# Patient Record
Sex: FEMALE | URBAN - METROPOLITAN AREA
[De-identification: names, ages, dates, MRNs, and addresses within clinical notes are randomized per-mention and may not be internally consistent; named-entity substitution may affect disease eponyms.]

---

## 2020-12-03 ENCOUNTER — NEW PATIENT (OUTPATIENT)
Dept: URBAN - METROPOLITAN AREA CLINIC 43 | Facility: CLINIC | Age: 65
End: 2020-12-03
Payer: COMMERCIAL

## 2020-12-03 DIAGNOSIS — H11.013 AMYLOID PTERYGIUM OF EYE, BILATERAL: ICD-10-CM

## 2020-12-03 DIAGNOSIS — H25.813 COMBINED FORMS OF AGE-RELATED CATARACT, BILATERAL: Primary | ICD-10-CM

## 2020-12-03 DIAGNOSIS — H43.813 VITREOUS DEGENERATION, BILATERAL: ICD-10-CM

## 2020-12-03 DIAGNOSIS — R73.03 OTHER ABNORMAL GLUCOSE: ICD-10-CM

## 2020-12-03 DIAGNOSIS — H52.4 PRESBYOPIA: ICD-10-CM

## 2020-12-03 PROCEDURE — 92004 COMPRE OPH EXAM NEW PT 1/>: CPT | Performed by: OPTOMETRIST

## 2020-12-03 ASSESSMENT — VISUAL ACUITY
OS: 20/25
OD: 20/30

## 2020-12-03 ASSESSMENT — KERATOMETRY
OS: 44.00
OD: 45.13

## 2022-11-10 ENCOUNTER — OFFICE VISIT (OUTPATIENT)
Dept: URBAN - METROPOLITAN AREA CLINIC 43 | Facility: CLINIC | Age: 67
End: 2022-11-10
Payer: COMMERCIAL

## 2022-11-10 DIAGNOSIS — H43.813 VITREOUS DEGENERATION, BILATERAL: ICD-10-CM

## 2022-11-10 DIAGNOSIS — R73.03 OTHER ABNORMAL GLUCOSE: Primary | ICD-10-CM

## 2022-11-10 DIAGNOSIS — H53.022 REFRACTIVE AMBLYOPIA, LEFT EYE: ICD-10-CM

## 2022-11-10 DIAGNOSIS — Z79.899 OTHER LONG TERM DRUG THERAPY: ICD-10-CM

## 2022-11-10 DIAGNOSIS — H11.013 AMYLOID PTERYGIUM OF EYE, BILATERAL: ICD-10-CM

## 2022-11-10 DIAGNOSIS — H25.813 COMBINED FORMS OF AGE-RELATED CATARACT, BILATERAL: ICD-10-CM

## 2022-11-10 PROCEDURE — 99214 OFFICE O/P EST MOD 30 MIN: CPT | Performed by: OPTOMETRIST

## 2022-11-10 PROCEDURE — 92134 CPTRZ OPH DX IMG PST SGM RTA: CPT | Performed by: OPTOMETRIST

## 2022-11-10 ASSESSMENT — KERATOMETRY
OD: 45.00
OS: 44.13

## 2022-11-10 ASSESSMENT — INTRAOCULAR PRESSURE
OS: 14
OD: 15

## 2022-11-10 ASSESSMENT — VISUAL ACUITY
OD: 20/25
OS: 20/40

## 2022-11-10 NOTE — IMPRESSION/PLAN
Impression: Amyloid pterygium of eye, bilateral Plan: s/p excision OS, recommend UV and wind protection, ATs, monitor

## 2022-11-10 NOTE — IMPRESSION/PLAN
Impression: Combined forms of age-related cataract, bilateral: H25.813. Plan:  Discussed cataracts with patient. Discussed treatment options. Surgical treatment is recommended. Surgical risks and benefits discussed. Patient elects surgical treatment. Recommend surgery OU, OD first. standard lens, Aim OD: plano. Aim OS: plano. Patient will need glasses for full time wear.  Outcome of surgery limitations include:  Amyloid pterygium of eye, bilateral,

## 2022-11-10 NOTE — IMPRESSION/PLAN
Impression: Other long term drug therapy: Z79.899.  Plan: pt taking 400mg po of plaquenil started recently for arthritis
mac OCT today: no signs of toxicity, normal macular contour
return after cat sx for HVF10-2

## 2022-11-10 NOTE — IMPRESSION/PLAN
Impression: Refractive amblyopia, left eye: H53.022.  Plan: high cyl OS>>OD, may impact outcome of cat sx

## 2023-01-05 ENCOUNTER — TESTING ONLY (OUTPATIENT)
Dept: URBAN - METROPOLITAN AREA CLINIC 43 | Facility: CLINIC | Age: 68
End: 2023-01-05
Payer: COMMERCIAL

## 2023-01-05 DIAGNOSIS — H25.813 COMBINED FORMS OF AGE-RELATED CATARACT, BILATERAL: Primary | ICD-10-CM

## 2023-01-05 ASSESSMENT — PACHYMETRY
OD: 3.33
OS: 3.26
OS: 23.54
OD: 23.75

## 2023-01-09 ENCOUNTER — PRE-OPERATIVE VISIT (OUTPATIENT)
Dept: URBAN - METROPOLITAN AREA CLINIC 43 | Facility: CLINIC | Age: 68
End: 2023-01-09
Payer: COMMERCIAL

## 2023-01-09 DIAGNOSIS — R73.03 OTHER ABNORMAL GLUCOSE: ICD-10-CM

## 2023-01-09 DIAGNOSIS — H25.813 COMBINED FORMS OF AGE-RELATED CATARACT, BILATERAL: ICD-10-CM

## 2023-01-09 DIAGNOSIS — H43.813 VITREOUS DEGENERATION, BILATERAL: ICD-10-CM

## 2023-01-09 DIAGNOSIS — H53.022 REFRACTIVE AMBLYOPIA, LEFT EYE: Primary | ICD-10-CM

## 2023-01-09 DIAGNOSIS — H11.013 AMYLOID PTERYGIUM OF EYE, BILATERAL: ICD-10-CM

## 2023-01-09 PROCEDURE — 99204 OFFICE O/P NEW MOD 45 MIN: CPT | Performed by: OPHTHALMOLOGY

## 2023-01-09 NOTE — IMPRESSION/PLAN
Impression: Other abnormal glucose Plan: No diabetic retinopathy. Recommend yearly diabetic eye exam. Discussed with patient importance of good blood sugar control. 
 Discussed with patient will limit the vision post cataract surgery

## 2023-01-09 NOTE — IMPRESSION/PLAN
Impression: Combined forms of age-related cataract, bilateral: H25.813. Plan: Patients cataract are visually significant and affecting patients daily activities. Okay to proceed with cataract surgery. Discussed risks, benefits and alternatives to surgery including but not limited to: bleeding, infection, risk of vision loss, loss of the eye, need for other surgery. Patient voiced understanding and wishes to proceed. RIGHT EYE THEN LEFT EYE
RL2, INJECTABLE OU (DEXTENZA FIRST CHOICE , TRIMOXI SECOND CHOICE) LENS: [id number] 18.50 OD
AIM:-0.25 OU Pt understands will need glasses for BCVA after Sx.

## 2023-01-09 NOTE — IMPRESSION/PLAN
Impression: Amyloid pterygium of eye, bilateral Plan: s/p excision OS, recommend UV and wind protection, ATs, monitor  Discussed with patient will limit the vision post cataract surgery

## 2023-02-01 ENCOUNTER — POST-OPERATIVE VISIT (OUTPATIENT)
Dept: URBAN - METROPOLITAN AREA CLINIC 43 | Facility: CLINIC | Age: 68
End: 2023-02-01

## 2023-02-01 DIAGNOSIS — Z48.810 ENCOUNTER FOR SURGICAL AFTERCARE FOLLOWING SURGERY ON A SENSE ORGAN: Primary | ICD-10-CM

## 2023-02-01 PROCEDURE — 99024 POSTOP FOLLOW-UP VISIT: CPT | Performed by: OPTOMETRIST

## 2023-02-01 ASSESSMENT — INTRAOCULAR PRESSURE: OD: 14

## 2023-02-01 NOTE — IMPRESSION/PLAN
Impression: S/P Cataract Extraction by phacoemulsification with IOL placement; DEXTENZA OD - 1 Day. Encounter for surgical aftercare following surgery on a sense organ  Z48.810. Excellent post op course   Post operative instructions reviewed - Condition is improving - Plan: Pt doing well, return as scheduled for next post op --Advised patient to use artificial tears for comfort.

## 2023-02-08 ENCOUNTER — POST-OPERATIVE VISIT (OUTPATIENT)
Dept: URBAN - METROPOLITAN AREA CLINIC 43 | Facility: CLINIC | Age: 68
End: 2023-02-08

## 2023-02-08 DIAGNOSIS — Z48.810 ENCOUNTER FOR SURGICAL AFTERCARE FOLLOWING SURGERY ON A SENSE ORGAN: Primary | ICD-10-CM

## 2023-02-08 PROCEDURE — 99024 POSTOP FOLLOW-UP VISIT: CPT | Performed by: OPTOMETRIST

## 2023-02-08 ASSESSMENT — INTRAOCULAR PRESSURE
OD: 14
OS: 14

## 2023-02-08 ASSESSMENT — VISUAL ACUITY
OD: 20/40
OS: 20/40

## 2023-02-08 NOTE — IMPRESSION/PLAN
Impression: S/P Cataract Extraction by phacoemulsification with IOL placement; DEXTENZA OD - 8 Days. Encounter for surgical aftercare following surgery on a sense organ  Z48.810. Excellent post op course   Post operative instructions reviewed - Condition is improving - Plan: Pt doing well, proceed with second eye,. MAC OCT WNL --Advised patient to use artificial tears for comfort.

## 2023-02-15 ENCOUNTER — POST-OPERATIVE VISIT (OUTPATIENT)
Dept: URBAN - METROPOLITAN AREA CLINIC 43 | Facility: CLINIC | Age: 68
End: 2023-02-15

## 2023-02-15 DIAGNOSIS — Z96.1 PRESENCE OF INTRAOCULAR LENS: Primary | ICD-10-CM

## 2023-02-15 PROCEDURE — 99024 POSTOP FOLLOW-UP VISIT: CPT | Performed by: OPTOMETRIST

## 2023-02-15 ASSESSMENT — INTRAOCULAR PRESSURE: OS: 21

## 2023-04-13 ENCOUNTER — OFFICE VISIT (OUTPATIENT)
Dept: URBAN - METROPOLITAN AREA CLINIC 43 | Facility: CLINIC | Age: 68
End: 2023-04-13
Payer: COMMERCIAL

## 2023-04-13 DIAGNOSIS — Z79.899 OTHER LONG TERM (CURRENT) DRUG THERAPY: Primary | ICD-10-CM

## 2023-04-13 PROCEDURE — 92083 EXTENDED VISUAL FIELD XM: CPT | Performed by: OPTOMETRIST

## 2023-04-13 PROCEDURE — 92134 CPTRZ OPH DX IMG PST SGM RTA: CPT | Performed by: OPTOMETRIST

## 2023-04-13 PROCEDURE — 99213 OFFICE O/P EST LOW 20 MIN: CPT | Performed by: OPTOMETRIST

## 2023-04-13 ASSESSMENT — INTRAOCULAR PRESSURE
OD: 13
OS: 14

## 2023-04-13 NOTE — IMPRESSION/PLAN
Impression: Other long term drug therapy: Z79.899. Plan: pt taking 400mg po of plaquenil started 2022 for arthritis
mac OCT today: normal, no signs of toxicity HVF10-2 today: reliable, OD: few inf defects, OS: clear field
baseline for plaquenil screening
cont care with rheumatology,
return 9 months for CE//mac OCT

## 2024-04-23 ENCOUNTER — OFFICE VISIT (OUTPATIENT)
Dept: URBAN - METROPOLITAN AREA CLINIC 43 | Facility: CLINIC | Age: 69
End: 2024-04-23
Payer: COMMERCIAL

## 2024-04-23 DIAGNOSIS — H43.813 VITREOUS DEGENERATION, BILATERAL: ICD-10-CM

## 2024-04-23 DIAGNOSIS — Z79.899 OTHER LONG TERM (CURRENT) DRUG THERAPY: ICD-10-CM

## 2024-04-23 DIAGNOSIS — H04.123 DRY EYE SYNDROME OF BILATERAL LACRIMAL GLANDS: ICD-10-CM

## 2024-04-23 DIAGNOSIS — H11.013 AMYLOID PTERYGIUM OF EYE, BILATERAL: ICD-10-CM

## 2024-04-23 DIAGNOSIS — R73.03 OTHER ABNORMAL GLUCOSE: Primary | ICD-10-CM

## 2024-04-23 PROCEDURE — 99214 OFFICE O/P EST MOD 30 MIN: CPT | Performed by: OPTOMETRIST

## 2024-04-23 PROCEDURE — 92134 CPTRZ OPH DX IMG PST SGM RTA: CPT | Performed by: OPTOMETRIST

## 2024-04-23 PROCEDURE — 92083 EXTENDED VISUAL FIELD XM: CPT | Performed by: OPTOMETRIST

## 2024-04-23 ASSESSMENT — INTRAOCULAR PRESSURE
OD: 13
OS: 13

## 2024-04-23 ASSESSMENT — KERATOMETRY
OS: 44.13
OD: 44.88

## 2024-04-23 ASSESSMENT — VISUAL ACUITY
OS: 20/20
OD: 20/20

## 2025-06-03 ENCOUNTER — OFFICE VISIT (OUTPATIENT)
Dept: URBAN - METROPOLITAN AREA CLINIC 43 | Facility: CLINIC | Age: 70
End: 2025-06-03
Payer: COMMERCIAL

## 2025-06-03 DIAGNOSIS — Z79.899 OTHER LONG TERM (CURRENT) DRUG THERAPY: Primary | ICD-10-CM

## 2025-06-03 DIAGNOSIS — R73.03 OTHER ABNORMAL GLUCOSE: ICD-10-CM

## 2025-06-03 DIAGNOSIS — H11.013 AMYLOID PTERYGIUM OF EYE, BILATERAL: ICD-10-CM

## 2025-06-03 DIAGNOSIS — H43.813 VITREOUS DEGENERATION, BILATERAL: ICD-10-CM

## 2025-06-03 DIAGNOSIS — H04.123 DRY EYE SYNDROME OF BILATERAL LACRIMAL GLANDS: ICD-10-CM

## 2025-06-03 PROCEDURE — 92083 EXTENDED VISUAL FIELD XM: CPT | Performed by: OPTOMETRIST

## 2025-06-03 PROCEDURE — 99214 OFFICE O/P EST MOD 30 MIN: CPT | Performed by: OPTOMETRIST

## 2025-06-03 PROCEDURE — 92134 CPTRZ OPH DX IMG PST SGM RTA: CPT | Performed by: OPTOMETRIST

## 2025-06-03 ASSESSMENT — KERATOMETRY
OS: 44.13
OD: 44.75

## 2025-06-03 ASSESSMENT — INTRAOCULAR PRESSURE
OD: 15
OS: 14

## 2025-06-03 ASSESSMENT — VISUAL ACUITY
OS: 20/20
OD: 20/20